# Patient Record
Sex: MALE | Race: WHITE | NOT HISPANIC OR LATINO | Employment: OTHER | URBAN - METROPOLITAN AREA
[De-identification: names, ages, dates, MRNs, and addresses within clinical notes are randomized per-mention and may not be internally consistent; named-entity substitution may affect disease eponyms.]

---

## 2019-07-29 ENCOUNTER — TRANSFERRED RECORDS (OUTPATIENT)
Dept: HEALTH INFORMATION MANAGEMENT | Facility: CLINIC | Age: 69
End: 2019-07-29

## 2019-08-13 ENCOUNTER — TRANSFERRED RECORDS (OUTPATIENT)
Dept: HEALTH INFORMATION MANAGEMENT | Facility: CLINIC | Age: 69
End: 2019-08-13

## 2019-09-23 ENCOUNTER — TRANSFERRED RECORDS (OUTPATIENT)
Dept: HEALTH INFORMATION MANAGEMENT | Facility: CLINIC | Age: 69
End: 2019-09-23

## 2019-09-30 ENCOUNTER — TRANSFERRED RECORDS (OUTPATIENT)
Dept: HEALTH INFORMATION MANAGEMENT | Facility: CLINIC | Age: 69
End: 2019-09-30

## 2019-10-03 ENCOUNTER — HOSPITAL ENCOUNTER (OUTPATIENT)
Facility: CLINIC | Age: 69
End: 2019-10-03
Attending: UROLOGY | Admitting: UROLOGY
Payer: COMMERCIAL

## 2019-10-03 ENCOUNTER — TRANSFERRED RECORDS (OUTPATIENT)
Dept: HEALTH INFORMATION MANAGEMENT | Facility: CLINIC | Age: 69
End: 2019-10-03

## 2019-10-30 NOTE — TELEPHONE ENCOUNTER
ONCOLOGY INTAKE: Records Information      APPT INFORMATION:  Referring provider:  Dr. Richie Watson  Referring provider s clinic:  [facility]  Reason for visit/diagnosis:  prostate cancer  Has patient been notified of appointment date and time?: yes    RECORDS INFORMATION:  Were the records received with the referral (via [employer])? no    Has patient been seen for any external appt for this diagnosis? yes    If yes, where? [facility]    Has patient had any imaging or procedures outside of [place] for this condition? yes      If Yes, where? [facility] - Bone Scan, [place] [facility]    ADDITIONAL INFORMATION:

## 2019-10-30 NOTE — TELEPHONE ENCOUNTER
RECORDS STATUS - ALL OTHER DIAGNOSIS      Action    Action Taken October 30, 2019 1:08 PM  Call to PT and emailed him an KEILA to sign for [facility].    October 31, 2019 8:14 AM  Signed KEILA (from [facility]) Received from PT.  Faxed STAT request to [facility] for all Recs, IMG and Bx slides available     RECORDS RECEIVED FROM: [facility]; [facility]   DATE RECEIVED: Awaiting KEILA   NOTES STATUS DETAILS   OFFICE NOTE from referring provider     OFFICE NOTE  Received 10/31/19 [facility]   DISCHARGE SUMMARY from hospital     DISCHARGE REPORT from the ER     OPERATIVE REPORT     MEDICATION LIST     CLINICAL TRIAL TREATMENTS TO DATE     LABS     PATHOLOGY REPORTS Received 10/31/19 [facility]   ANYTHING RELATED TO DIAGNOSIS Slides requested 10/31/19 [facility]   GENONOMIC TESTING     TYPE:     IMAGING (NEED IMAGES & REPORT)     CT SCANS Reqeusted 10/31/19  Merged to PACS 11/1/19 [facility]   MRI Reqeusted 10/31/19  Merged to PACS 11/1/19 [facility]   MAMMO     ULTRASOUND     NM BONE SCAN Reqeusted 10/31/19  Merged to PACS 11/1/19 [facility]

## 2019-11-04 ENCOUNTER — ONCOLOGY VISIT (OUTPATIENT)
Dept: ONCOLOGY | Facility: CLINIC | Age: 69
End: 2019-11-04
Attending: INTERNAL MEDICINE
Payer: COMMERCIAL

## 2019-11-04 ENCOUNTER — PRE VISIT (OUTPATIENT)
Dept: ONCOLOGY | Facility: CLINIC | Age: 69
End: 2019-11-04

## 2019-11-04 VITALS
OXYGEN SATURATION: 98 % | BODY MASS INDEX: 21.67 KG/M2 | RESPIRATION RATE: 16 BRPM | TEMPERATURE: 97.7 F | WEIGHT: 143 LBS | HEART RATE: 64 BPM | DIASTOLIC BLOOD PRESSURE: 81 MMHG | SYSTOLIC BLOOD PRESSURE: 124 MMHG | HEIGHT: 68 IN

## 2019-11-04 DIAGNOSIS — C61 PROSTATE CANCER (H): Primary | ICD-10-CM

## 2019-11-04 PROCEDURE — 99205 OFFICE O/P NEW HI 60 MIN: CPT | Mod: ZP | Performed by: INTERNAL MEDICINE

## 2019-11-04 PROCEDURE — G0463 HOSPITAL OUTPT CLINIC VISIT: HCPCS | Mod: ZF

## 2019-11-04 RX ORDER — OMEGA-3 FATTY ACIDS/FISH OIL 300-1000MG
200 CAPSULE ORAL EVERY 4 HOURS PRN
COMMUNITY

## 2019-11-04 RX ORDER — SILDENAFIL 25 MG/1
25 TABLET, FILM COATED ORAL DAILY PRN
COMMUNITY

## 2019-11-04 RX ORDER — VALACYCLOVIR HYDROCHLORIDE 500 MG/1
500 TABLET, FILM COATED ORAL PRN
COMMUNITY

## 2019-11-04 SDOH — HEALTH STABILITY: MENTAL HEALTH: HOW MANY STANDARD DRINKS CONTAINING ALCOHOL DO YOU HAVE ON A TYPICAL DAY?: 1 OR 2

## 2019-11-04 ASSESSMENT — PAIN SCALES - GENERAL: PAINLEVEL: NO PAIN (0)

## 2019-11-04 ASSESSMENT — MIFFLIN-ST. JEOR: SCORE: 1382.39

## 2019-11-04 NOTE — NURSING NOTE
"Oncology Rooming Note    November 4, 2019 1:06 PM   Raghavendra Amaya is a 69 year old male who presents for:    Chief Complaint   Patient presents with     Oncology Clinic Visit     New; Prostate Ca; 2nd Opinion     Initial Vitals: /81   Pulse 64   Temp 97.7  F (36.5  C) (Oral)   Resp 16   Ht 1.726 m (5' 7.95\")   Wt 64.9 kg (143 lb)   SpO2 98%   BMI 21.77 kg/m   Estimated body mass index is 21.77 kg/m  as calculated from the following:    Height as of this encounter: 1.726 m (5' 7.95\").    Weight as of this encounter: 64.9 kg (143 lb). Body surface area is 1.76 meters squared.  No Pain (0) Comment: Data Unavailable   No LMP for male patient.  Allergies reviewed: Yes  Medications reviewed: Yes    Medications: Medication refills not needed today.  Pharmacy name entered into EPIC: [id number] [place], [place] - [address]    Clinical concerns: 2nd Opinion for new diagnosis       Natalia Lutz, ALEXUS              "

## 2019-11-04 NOTE — LETTER
RE: Raghavendra Amaya  [id number] [address] [id number]     Dear Colleague,    Thank you for referring your patient, Raghavendra Amaya, to the [facility]. Please see a copy of my visit note below.    [facility]   New Patient Consultation Note  Date of Visit November 4, 2019    CC: Localized Prostate Cancer -here for second opinion    HPI: The patient is a 69-year-old gentleman who is self-referred for a second opinion regarding localized prostate cancer.  He has had several years of normal PSA values but recently had a PSA that was elevated and an elevated for K score and a MP MRI that found to suspicious lesions with PI-RADS score equal to 4 he underwent a biopsy and was found to have [place] 4+4 = 8 disease as well as several foci of Jeffery 3+4 disease in 2 systemic cores.  He has been offered a laparoscopic assisted robotic prostatectomy by Dr. loco and he is here to discuss whether that is the best treatment option for him.  His past medical history is significant for mild emphysema which requires inhalers as well as a history of cataract surgery.  Social history is that he was trained as a  he is  he lives in the [facility] he exercises on a regular basis he does not smoke.  He also worked as a  and has a history of  service in the [employer].    Patient's medications include only valacyclovir for cold sores and Viagra as needed.    Family history is noncontributory to today's visit    Review of systems was not performed    Physical exam was deferred    I reviewed the patient's pathology report in detail and confirmed the above findings of Jeffery 8 and Jeffery 7 disease    I reviewed the patient's bone scan a CAT scan which failed to show any sign of metastatic disease.      Impression  This is a 68 yo healthy man with a new diagnosis of Jeffery 8,  T2 prostate cancer with a PSA in the 4-6 range. He is a good surgical candidate and  most of our consultation today was spent discussing the utility of radical prostatectomy and the potential algorithm for therapy should this fail to control the disease.      I spent an hour with this patient and we discussed the various options.    1. radical prostatectomy followed by salvage radiation therapy if needed    2.  Radical prostatectomy with immediate adjuvant radiation therapy if extracapsular extension or positive margins are seen    3.  Radiation therapy in the form of brachii therapy seeds or external beam radiation therapy  Use of next generation imaging techniques to determine if he has occult metastatic disease.    4 active surveillance.  I do not recommend this based on the patient's high-grade disease in the good general health which make him an excellent surgical candidate and suggested life expectancy is greater than 15 years based on the absence of comorbid illness.    Based on his low PSA relative small number of positive cores in the presence of a single core of high-grade disease I have recommended that the patient proceed with radical prostatectomy as planned.  In my view this offers him the best opportunity for cure of his cancer and the lowest risk of morbidity due to treatments in addition I stated to him that many salvage opportunities exist following radical prostatectomy if he developed a PSA.  We discussed the implications of a rising PSA post prostatectomy and the fact these can occur in up to 33% or so of patients with high-grade disease to undergo radical prostatectomy.      The patient had ample time to ask questions and appeared satisfied with the discussion and the recommendations made to him.      I will see the patient back on an as-needed basis I have asked him to check back to me after surgery to review his pathology we did discuss the fact that upstaging is not uncommon in cases such as this.    Chance Barton MD

## 2019-11-05 PROCEDURE — 00000346 ZZHCL STATISTIC REVIEW OUTSIDE SLIDES TC 88321: Performed by: INTERNAL MEDICINE

## 2019-11-06 NOTE — PROGRESS NOTES
[facility]   New Patient Consultation Note  Date of Visit November 4, 2019      CC: Localized Prostate Cancer -here for second opinion    HPI: The patient is a 69-year-old gentleman who is self-referred for a second opinion regarding localized prostate cancer.  He has had several years of normal PSA values but recently had a PSA that was elevated and an elevated for K score and a MP MRI that found to suspicious lesions with PI-RADS score equal to 4 he underwent a biopsy and was found to have [place] 4+4 = 8 disease as well as several foci of Jeffery 3+4 disease in 2 systemic cores.  He has been offered a laparoscopic assisted robotic prostatectomy by Dr. loco and he is here to discuss whether that is the best treatment option for him.  His past medical history is significant for mild emphysema which requires inhalers as well as a history of cataract surgery.  Social history is that he was trained as a  he is  he lives in the [facility] he exercises on a regular basis he does not smoke.  He also worked as a  and has a history of  service in the [employer].      Patient's medications include only valacyclovir for cold sores and Viagra as needed.    Family history is noncontributory to today's visit    Review of systems was not performed    Physical exam was deferred    I reviewed the patient's pathology report in detail and confirmed the above findings of Jeffery 8 and Jeffery 7 disease    I reviewed the patient's bone scan a CAT scan which failed to show any sign of metastatic disease.      Impression  This is a 68 yo healthy man with a new diagnosis of Rob[place] 8,  T2 prostate cancer with a PSA in the 4-6 range. He is a good surgical candidate and most of our consultation today was spent discussing the utility of radical prostatectomy and the potential algorithm for therapy should this fail to control the disease.      I spent an hour with this patient and we discussed the  various options.    1. radical prostatectomy followed by salvage radiation therapy if needed    2.  Radical prostatectomy with immediate adjuvant radiation therapy if extracapsular extension or positive margins are seen    3.  Radiation therapy in the form of brachii therapy seeds or external beam radiation therapy  Use of next generation imaging techniques to determine if he has occult metastatic disease.    4 active surveillance.  I do not recommend this based on the patient's high-grade disease in the good general health which make him an excellent surgical candidate and suggested life expectancy is greater than 15 years based on the absence of comorbid illness.    Based on his low PSA relative small number of positive cores in the presence of a single core of high-grade disease I have recommended that the patient proceed with radical prostatectomy as planned.  In my view this offers him the best opportunity for cure of his cancer and the lowest risk of morbidity due to treatments in addition I stated to him that many salvage opportunities exist following radical prostatectomy if he developed a PSA.  We discussed the implications of a rising PSA post prostatectomy and the fact these can occur in up to 33% or so of patients with high-grade disease to undergo radical prostatectomy.      The patient had ample time to ask questions and appeared satisfied with the discussion and the recommendations made to him.      I will see the patient back on an as-needed basis I have asked him to check back to me after surgery to review his pathology we did discuss the fact that upstaging is not uncommon in cases such as this.    Chance Barton MD

## 2019-11-08 LAB — COPATH REPORT: NORMAL

## 2019-11-09 ENCOUNTER — HEALTH MAINTENANCE LETTER (OUTPATIENT)
Age: 69
End: 2019-11-09

## 2019-12-06 ASSESSMENT — MIFFLIN-ST. JEOR: SCORE: 1369.07

## 2019-12-10 ENCOUNTER — AMBULATORY - HEALTHEAST (OUTPATIENT)
Dept: OTHER | Facility: CLINIC | Age: 69
End: 2019-12-10

## 2019-12-10 ENCOUNTER — SURGERY - HEALTHEAST (OUTPATIENT)
Dept: SURGERY | Facility: HOSPITAL | Age: 69
End: 2019-12-10

## 2019-12-10 ENCOUNTER — ANESTHESIA - HEALTHEAST (OUTPATIENT)
Dept: SURGERY | Facility: HOSPITAL | Age: 69
End: 2019-12-10

## 2019-12-10 ENCOUNTER — DOCUMENTATION ONLY (OUTPATIENT)
Dept: OTHER | Facility: CLINIC | Age: 69
End: 2019-12-10

## 2019-12-10 ASSESSMENT — MIFFLIN-ST. JEOR: SCORE: 1361.36

## 2019-12-16 ENCOUNTER — COMMUNICATION - HEALTHEAST (OUTPATIENT)
Dept: SCHEDULING | Facility: CLINIC | Age: 69
End: 2019-12-16

## 2020-02-23 ENCOUNTER — HEALTH MAINTENANCE LETTER (OUTPATIENT)
Age: 70
End: 2020-02-23

## 2020-12-06 ENCOUNTER — HEALTH MAINTENANCE LETTER (OUTPATIENT)
Age: 70
End: 2020-12-06

## 2021-04-11 ENCOUNTER — HEALTH MAINTENANCE LETTER (OUTPATIENT)
Age: 71
End: 2021-04-11

## 2021-06-04 VITALS — HEIGHT: 68 IN | WEIGHT: 139.3 LBS | BODY MASS INDEX: 21.11 KG/M2

## 2021-06-04 NOTE — ANESTHESIA POSTPROCEDURE EVALUATION
Patient: Raghavendra Amaya  ROBOTIC RADICAL RETROPUBIC PROSTATECTOMY WITH BILATERAL PELVIC LYMPH NODE DISSECTION  Anesthesia type: general    Patient location: PACU  Last vitals:   Vitals Value Taken Time   /55 12/10/2019  2:50 PM   Temp 36.7  C (98  F) 12/10/2019  2:50 PM   Pulse 74 12/10/2019  2:52 PM   Resp 15 12/10/2019  2:51 PM   SpO2 100 % 12/10/2019  2:52 PM   Vitals shown include unvalidated device data.  Post vital signs: stable  Level of consciousness: awake and responds to simple questions  Post-anesthesia pain: pain controlled  Post-anesthesia nausea and vomiting: no  Pulmonary: unassisted, return to baseline  Cardiovascular: stable and blood pressure at baseline  Hydration: adequate  Anesthetic events: no    QCDR Measures:  ASA# 11 - Preeti-op Cardiac Arrest: ASA11B - Patient did NOT experience unanticipated cardiac arrest  ASA# 12 - Preeti-op Mortality Rate: ASA12B - Patient did NOT die  ASA# 13 - PACU Re-Intubation Rate: ASA13B - Patient did NOT require a new airway mgmt  ASA# 10 - Composite Anes Safety: ASA10A - No serious adverse event    Additional Notes:

## 2021-06-04 NOTE — ANESTHESIA PREPROCEDURE EVALUATION
Anesthesia Evaluation      Patient summary reviewed   No history of anesthetic complications     Airway   Mallampati: I  Neck ROM: full   Pulmonary - negative ROS and normal exam                          Cardiovascular - negative ROS and normal exam  Exercise tolerance: > or = 4 METS  (-) murmur  Rhythm: regular  Rate: normal,    no murmur      Neuro/Psych - negative ROS     Endo/Other - negative ROS      GI/Hepatic/Renal - negative ROS           Dental - normal exam                        Anesthesia Plan  Planned anesthetic: general endotracheal    ASA 1   Induction: intravenous   Anesthetic plan and risks discussed with: patient, spouse and child/children  Anesthesia plan special considerations: antiemetics,   Post-op plan: routine recovery

## 2021-06-04 NOTE — ANESTHESIA CARE TRANSFER NOTE
Last vitals:   Vitals:    12/10/19 1345   BP: 132/60   Pulse: 64   Resp: 20   Temp: 36.6  C (97.9  F)   SpO2: 100%     Patient's level of consciousness is drowsy  Spontaneous respirations: yes  Maintains airway independently: yes  Dentition unchanged: yes  Oropharynx: oropharynx clear of all foreign objects    QCDR Measures:  ASA# 20 - Surgical Safety Checklist: WHO surgical safety checklist completed prior to induction    PQRS# 430 - Adult PONV Prevention: 4558F - Pt received => 2 anti-emetic agents (different classes) preop & intraop  ASA# 8 - Peds PONV Prevention: NA - Not pediatric patient, not GA or 2 or more risk factors NOT present  PQRS# 424 - Preeti-op Temp Management: 4559F - At least one body temp DOCUMENTED => 35.5C or 95.9F within required timeframe  PQRS# 426 - PACU Transfer Protocol: - Transfer of care checklist used  ASA# 14 - Acute Post-op Pain: ASA14B - Patient did NOT experience pain >= 7 out of 10

## 2021-09-26 ENCOUNTER — HEALTH MAINTENANCE LETTER (OUTPATIENT)
Age: 71
End: 2021-09-26

## 2022-05-07 ENCOUNTER — HEALTH MAINTENANCE LETTER (OUTPATIENT)
Age: 72
End: 2022-05-07

## 2023-04-23 ENCOUNTER — HEALTH MAINTENANCE LETTER (OUTPATIENT)
Age: 73
End: 2023-04-23

## 2023-06-02 ENCOUNTER — HEALTH MAINTENANCE LETTER (OUTPATIENT)
Age: 73
End: 2023-06-02